# Patient Record
Sex: FEMALE | Race: AMERICAN INDIAN OR ALASKA NATIVE
[De-identification: names, ages, dates, MRNs, and addresses within clinical notes are randomized per-mention and may not be internally consistent; named-entity substitution may affect disease eponyms.]

---

## 2018-04-29 NOTE — EMERGENCY DEPARTMENT REPORT
Blank Doc





- Documentation


Documentation: 





Patient is 68 years old female history of diabetes.  Patient presented to the 

ER complaining of abdominal pain, crampy in nature associated with nausea and 

vomiting and diarrhea.  Patient stated that she is unable to keep anything down 

since Monday.  She also complaining of generalized weakness.  Patient will need 

further evaluation so patient will be moved to our main ED.

## 2018-04-29 NOTE — EMERGENCY DEPARTMENT REPORT
HPI





- General


Chief Complaint: Abdominal Pain


Time Seen by Provider: 18 16:14





- hospitals


HPI: 





Room 24





The patient is a 68-year-old female presented with a chief complaint of 

abdominal pain.  The patient states her symptoms began 6 days ago with nausea 

and vomiting that has been intermittent.  The patient states she also developed 

diffuse abdominal pain 6 days ago.  Patient denies fever, dysuria, hematuria or 

recent antibiotic use.  The patient states she had a small amount of diarrhea 

this morning.  The patient states she's had a decreased appetite.  The patient 

currently gives her pain a score of 6/10





Location: Diffuse abdomen


Duration: 6 days


Quality: Pain


Severity:6/10


Modifying factors: [see above]


Context: [see above]


Mode of transportation: [not driving]





ED Past Medical Hx





- Past Medical History


Hx Hypertension: Yes


Hx Diabetes: Yes


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





- Surgical History


Additional Surgical History: cataracts





- Family History


Family history: no significant





- Social History


Smoking Status: Never Smoker


Substance Use Type: None





- Medications


Home Medications: 


 Home Medications











 Medication  Instructions  Recorded  Confirmed  Last Taken  Type


 


Famotidine [Pepcid] 20 mg PO BID #20 tablet 18  Unknown Rx


 


HYDROcodone/APAP 5-325 [[place] 1 - 2 each PO Q6HR PRN #14 tablet 18  

Unknown Rx





5/325]     


 


Promethazine [Phenergan TAB] 25 mg PO Q6HR PRN #20 tab 18  Unknown Rx


 


Promethazine [Phenergan] 25 mg OR Q6HR PRN #5 supp.rect 18  Unknown Rx














ED Review of Systems


ROS: 


Stated complaint: VOMITTING,ABD PAIN,DIARRHEA


Other details as noted in HPI





Constitutional: denies: fever


Gastrointestinal: abdominal pain, nausea, vomiting, diarrhea


Genitourinary: denies: dysuria, hematuria





Physical Exam





- Physical Exam


Vital Signs: 


 Vital Signs











  18





  16:08


 


Temperature 97.6 F


 


Pulse Rate 99 H


 


Respiratory 18





Rate 


 


Blood Pressure 184/100


 


O2 Sat by Pulse 99





Oximetry 











Physical Exam: 





GENERAL: The patient is well-developed well-nourished female sitting on 

stretcher not appearing to be in acute distress. []


HEENT: Normocephalic.  Atraumatic.  Extraocular motions are intact.  Patient 

has moist mucous membranes.


NECK: Supple.  Trachea midline


CHEST/LUNGS: Clear to auscultation.  There is no respiratory distress noted.


HEART/CARDIOVASCULAR: Regular.  There is no tachycardia.  There is no gallop 

rub or murmur.


ABDOMEN: Abdomen is soft, with discomfort to palpation in all quadrants except 

the left upper quadrant.  Patient has normal bowel sounds.  There is no 

abdominal distention.


SKIN: There is no rash.  There is no edema.  There is no diaphoresis.


NEURO: The patient is awake, alert, and oriented.  The patient is cooperative.  

The patient has normal speech


MUSCULOSKELETAL:  There is no evidence of acute injury.





ED Course


 Vital Signs











  18





  16:08


 


Temperature 97.6 F


 


Pulse Rate 99 H


 


Respiratory 18





Rate 


 


Blood Pressure 184/100


 


O2 Sat by Pulse 99





Oximetry 














ED Medical Decision Making





- Lab Data


Result diagrams: 


 18 16:22





 18 16:22





 Laboratory Tests











  18





  16:22 16:22


 


WBC  8.4 


 


RBC  4.07 


 


Hgb  11.0 


 


Hct  35.7 


 


MCV  88 


 


MCH  27 L 


 


MCHC  31 


 


RDW  15.6 H 


 


Plt Count  326 


 


Lymph % (Auto)  29.5 


 


Mono % (Auto)  7.1 


 


Eos % (Auto)  1.6 


 


Baso % (Auto)  0.5 


 


Lymph #  2.5 


 


Mono #  0.6 


 


Eos #  0.1 


 


Baso #  0.0 


 


Seg Neutrophils %  61.3 


 


Seg Neutrophils #  5.1 


 


Sodium   137


 


Potassium   3.5 L


 


Chloride   95.0 L


 


Carbon Dioxide   28


 


Anion Gap   18


 


BUN   10


 


Creatinine   0.7


 


Estimated GFR   > 60


 


BUN/Creatinine Ratio   14


 


Glucose   158 H


 


Calcium   9.6


 


Total Bilirubin   0.30


 


Direct Bilirubin   < 0.2


 


Indirect Bilirubin   0.1


 


AST   16


 


ALT   13


 


Alkaline Phosphatase   65


 


Total Protein   7.8


 


Albumin   4.2


 


Albumin/Globulin Ratio   1.2


 


Lipase   45














- Radiology Data


Radiology results: report reviewed (CT abdomen and pelvis), image reviewed (CT 

abdomen and pelvis)





[facility] 


[address] [place] [address] 





Cat Scan Report 


Signed 





Patient: GRISEL ECHEVARRIA MR#: [id number] 


: 1950 Acct:[id number] 


Age/Sex: 68 / F ADM Date: 18 


Loc: ED 


Attending Dr: 








Ordering Physician: BRADEN WEBER MD 


Date of Service: 04/29/18 


Procedure(s): CT abdomen pelvis w con 


Accession Number(s): [id number] 





cc: BRADEN WEBER MD 








FINAL REPORT 





EXAM: CT ABDOMEN PELVIS W CON 





HISTORY: diffuse abdominal pain 





TECHNIQUE: Spiral CT scanning of the abdomen and pelvis after 


the uneventful administration of IV contrast. Multiplanar 


reformations. 100 mL Omnipaque IV. 





PRIORS: None. 





FINDINGS: 


Abdomen: 





Visualized lung bases grossly unremarkable. 





Probable tiny gallstones in the dependent portion the gallbladder 


without significant pericholecystic fluid. 





Liver shows diffusely decreased attenuation without focal 


abnormality. 





Spleen without significant abnormality. 





Pancreas without significant abnormality. 





Probable small bilateral renal cysts, largest in right renal 


lower pole measuring approximately 1.2 cm. No apparent renal 


calcifications or significant hydronephrosis. 





Adrenal glands without significant abnormality. 





Pelvis: 





Bowel grossly unremarkable, with moderate-large amount of 


retained stool. 





Appendix within normal limits. 





No significant free peritoneal fluid, discrete abscess or 


apparent adenopathy. 





Abdominal aorta non-aneurysmal. 





Uterus prominent, with lobular margins. 





Mild degenerative changes in thoracolumbar spine. 





IMPRESSION: 


1. Probable cholelithiasis. 





2. Findings compatible with fatty infiltration in the liver. 





3. Findings suggestive of constipation. 





4. Probable leiomyomatous change in the uterus. . 











Transcribed By: [facility] 


Dictated By: LIAM LOWE MD 


Electronically Authenticated By: LIAM LOWE MD 


Signed Date/Time: 18 











DD/DT: 18 


TD/TT: 18





- Differential Diagnosis


gastritis, peptic ulcer disease, GERD, pancreatitis, gastroenteritis


Critical care attestation.: 


If time is entered above; I have spent that time in minutes in the direct care 

of this critically ill patient, excluding procedure time.








ED Disposition


Clinical Impression: 


 Acute abdominal pain, Cholelithiasis, Nausea & vomiting





Disposition: DC-01 TO HOME OR SELFCARE


Is pt being admited?: No


Does the pt Need Aspirin: No


Condition: Stable


Instructions:  Abdominal Pain (ED)


Additional Instructions: 


Return to the emergency department immediately should you develop worsening 

symptoms, fever, inability to tolerate food or liquid or any other concerns.


Prescriptions: 


Famotidine [Pepcid] 20 mg PO BID #20 tablet


HYDROcodone/APAP 5-325 [Norco 5/325] 1 - 2 each PO Q6HR PRN #14 tablet


 PRN Reason: Pain


Promethazine [Phenergan TAB] 25 mg PO Q6HR PRN #20 tab


 PRN Reason: Nausea


Promethazine [Phenergan] 25 mg OR Q6HR PRN #5 supp.rect


 PRN Reason: Vomiting


Referrals: 


PRIMARY CARE,MD [Primary Care Provider] - ADELFO GARY MD [Staff Physician] - 3-5 Days (Dr. Lujan is a 

gastroenterologist.  Please follow up with him for further evaluation)


Time of Disposition: 18:28

## 2018-04-29 NOTE — CAT SCAN REPORT
FINAL REPORT



EXAM:  CT ABDOMEN PELVIS W CON



HISTORY:  diffuse abdominal pain 



TECHNIQUE:  Spiral CT scanning of the abdomen and pelvis after

the uneventful administration of IV contrast. Multiplanar

reformations. 100 mL Omnipaque IV.



PRIORS:  None.



FINDINGS:  

Abdomen: 



Visualized lung bases grossly unremarkable.  



Probable tiny gallstones in the dependent portion the gallbladder

without significant pericholecystic fluid.



Liver shows diffusely decreased attenuation without focal

abnormality. 



Spleen without significant abnormality. 



Pancreas without significant abnormality. 



Probable small bilateral renal cysts, largest in right renal

lower pole measuring approximately 1.2 cm. No apparent renal

calcifications or significant hydronephrosis. 



Adrenal glands without significant abnormality. 



Pelvis: 



Bowel grossly unremarkable, with moderate-large amount of

retained stool. 



Appendix within normal limits. 



No significant free peritoneal fluid, discrete abscess or

apparent adenopathy. 



Abdominal aorta non-aneurysmal. 



Uterus prominent, with lobular margins. 



Mild degenerative changes in thoracolumbar spine. 



IMPRESSION:  

1. Probable cholelithiasis. 



2. Findings compatible with fatty infiltration in the liver. 



3. Findings suggestive of constipation. 



4. Probable leiomyomatous change in the uterus. .

## 2018-05-09 ENCOUNTER — HOSPITAL ENCOUNTER (EMERGENCY)
Dept: HOSPITAL 5 - ED | Age: 68
LOS: 1 days | Discharge: HOME | End: 2018-05-10
Payer: MEDICARE

## 2018-05-09 DIAGNOSIS — R10.84: ICD-10-CM

## 2018-05-09 DIAGNOSIS — R11.2: Primary | ICD-10-CM

## 2018-05-09 DIAGNOSIS — Z88.6: ICD-10-CM

## 2018-05-09 DIAGNOSIS — R19.7: ICD-10-CM

## 2018-05-09 DIAGNOSIS — I10: ICD-10-CM

## 2018-05-09 DIAGNOSIS — E11.9: ICD-10-CM

## 2018-05-09 PROCEDURE — 81001 URINALYSIS AUTO W/SCOPE: CPT

## 2018-05-09 PROCEDURE — 82553 CREATINE MB FRACTION: CPT

## 2018-05-09 PROCEDURE — 36415 COLL VENOUS BLD VENIPUNCTURE: CPT

## 2018-05-09 PROCEDURE — 85025 COMPLETE CBC W/AUTO DIFF WBC: CPT

## 2018-05-09 PROCEDURE — 74177 CT ABD & PELVIS W/CONTRAST: CPT

## 2018-05-09 PROCEDURE — 80053 COMPREHEN METABOLIC PANEL: CPT

## 2018-05-09 PROCEDURE — 99284 EMERGENCY DEPT VISIT MOD MDM: CPT

## 2018-05-09 PROCEDURE — 84484 ASSAY OF TROPONIN QUANT: CPT

## 2018-05-09 PROCEDURE — 93005 ELECTROCARDIOGRAM TRACING: CPT

## 2018-05-09 PROCEDURE — 93010 ELECTROCARDIOGRAM REPORT: CPT

## 2018-05-09 PROCEDURE — 96361 HYDRATE IV INFUSION ADD-ON: CPT

## 2018-05-09 PROCEDURE — 83690 ASSAY OF LIPASE: CPT

## 2018-05-09 PROCEDURE — 82550 ASSAY OF CK (CPK): CPT

## 2018-05-09 PROCEDURE — 96374 THER/PROPH/DIAG INJ IV PUSH: CPT

## 2018-05-10 VITALS — SYSTOLIC BLOOD PRESSURE: 163 MMHG | DIASTOLIC BLOOD PRESSURE: 97 MMHG

## 2018-05-10 LAB
ALBUMIN SERPL-MCNC: 4.2 G/DL (ref 3.9–5)
ALT SERPL-CCNC: 14 UNITS/L (ref 7–56)
BACTERIA #/AREA URNS HPF: (no result) /HPF
BASOPHILS # (AUTO): 0 K/MM3 (ref 0–0.1)
BASOPHILS NFR BLD AUTO: 0.5 % (ref 0–1.8)
BILIRUB UR QL STRIP: (no result)
BLOOD UR QL VISUAL: (no result)
BUN SERPL-MCNC: 13 MG/DL (ref 7–17)
BUN/CREAT SERPL: 16 %
CALCIUM SERPL-MCNC: 9.5 MG/DL (ref 8.4–10.2)
EOSINOPHIL # BLD AUTO: 0.2 K/MM3 (ref 0–0.4)
EOSINOPHIL NFR BLD AUTO: 2 % (ref 0–4.3)
HCT VFR BLD CALC: 33.7 % (ref 30.3–42.9)
HEMOLYSIS INDEX: 20
HGB BLD-MCNC: 10.8 GM/DL (ref 10.1–14.3)
LIPASE SERPL-CCNC: 41 UNITS/L (ref 13–60)
LYMPHOCYTES # BLD AUTO: 2.5 K/MM3 (ref 1.2–5.4)
LYMPHOCYTES NFR BLD AUTO: 28.4 % (ref 13.4–35)
MCH RBC QN AUTO: 28 PG (ref 28–32)
MCHC RBC AUTO-ENTMCNC: 32 % (ref 30–34)
MCV RBC AUTO: 87 FL (ref 79–97)
MONOCYTES # (AUTO): 0.7 K/MM3 (ref 0–0.8)
MONOCYTES % (AUTO): 8 % (ref 0–7.3)
MUCOUS THREADS #/AREA URNS HPF: (no result) /HPF
PH UR STRIP: 6 [PH] (ref 5–7)
PLATELET # BLD: 249 K/MM3 (ref 140–440)
PROT UR STRIP-MCNC: (no result) MG/DL
RBC # BLD AUTO: 3.89 M/MM3 (ref 3.65–5.03)
RBC #/AREA URNS HPF: 1 /HPF (ref 0–6)
UROBILINOGEN UR-MCNC: < 2 MG/DL (ref ?–2)
WBC #/AREA URNS HPF: 6 /HPF (ref 0–6)

## 2018-05-10 NOTE — CAT SCAN REPORT
FINAL REPORT



PROCEDURE:  CT ABDOMEN PELVIS W CON



TECHNIQUE:  Computerized axial tomography of the abdomen and

pelvis was performed after the IV injection of iodinated nonionic

contrast. 



HISTORY:  abdominal pain 



COMPARISON:  04/29/2018



FINDINGS:  

Visualized lower thorax: No significant abnormality.



Liver: Normal size and attenuation.



Spleen: Normal size and attenuation.



Gallbladder and biliary system: Stones identified within the

gallbladder lumen. No dilatation of the biliary ductal system.



Pancreas: Normal.



Adrenals: Normal.



Kidneys: Normal.



GI tract: No obstruction. No ileus or enteritis. The cecum,

appendix and colon are normal..



Lymph nodes and mesentery: Normal. 



Vasculature: Normal.



Bladder: Normal.



Reproductive organs: Normal.



Peritoneum: No free fluid.



Musculoskeletal structures: No significant abnormality.



Other: None.  



IMPRESSION:  

There is no evidence of intestinal or urinary tract obstruction.

No ileus or enteritis. The appendix is normal.

## 2018-05-10 NOTE — EMERGENCY DEPARTMENT REPORT
ED Abdominal Pain HPI





- General


Chief Complaint: Nausea/Vomiting/Diarrhea


Stated Complaint: DIZZINESS; N/V


Time Seen by Provider: 05/10/18 02:47


Source: patient


Mode of arrival: Ambulatory


Limitations: No Limitations





- History of Present Illness


Initial Comments: 





Patient is 68 years old female history of hypertension and diabetes.  Patient 

presented to the ER complaining of several days of abdominal pain nausea 

vomiting and diarrhea.  Patient stated that she is feeling weak all over.  She 

denied any chest pain or shortness of breath or cough.  No fever.


MD Complaint: abdominal pain


-: days(s)


Location: diffuse


Radiation: none


Migration to: no migration


Severity: moderate


Quality: cramping


Consistency: constant


Associated Symptoms: nausea, vomiting, diarrhea





- Related Data


 Previous Rx's











 Medication  Instructions  Recorded  Last Taken  Type


 


Famotidine [Pepcid] 20 mg PO BID #20 tablet 04/29/18 Unknown Rx


 


HYDROcodone/APAP 5-325 [[place] 1 - 2 each PO Q6HR PRN #14 tablet 04/29/18 

Unknown Rx





5/325]    


 


Promethazine [Phenergan TAB] 25 mg PO Q6HR PRN #20 tab 04/29/18 Unknown Rx


 


Promethazine [Phenergan] 25 mg OR Q6HR PRN #5 supp.rect 04/29/18 Unknown Rx











 Allergies











Allergy/AdvReac Type Severity Reaction Status Date / Time


 


aspirin Allergy  Shortness Verified 04/29/18 16:11





   of Breath  














ED Review of Systems


ROS: 


Stated complaint: DIZZINESS; N/V


Other details as noted in HPI





Comment: All other systems reviewed and negative


Respiratory: denies: cough, orthopnea, shortness of breath, SOB with exertion, 

SOB at rest


Cardiovascular: denies: chest pain, palpitations


Gastrointestinal: abdominal pain, nausea, vomiting, diarrhea.  denies: 

constipation, hematemesis, melena, hematochezia


Neurological: denies: headache, weakness, numbness, paresthesias, confusion, 

abnormal gait





ED Past Medical Hx





- Past Medical History


Hx Hypertension: Yes


Hx Diabetes: Yes


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





- Surgical History


Additional Surgical History: cataracts





- Social History


Smoking Status: Never Smoker


Substance Use Type: None





- Medications


Home Medications: 


 Home Medications











 Medication  Instructions  Recorded  Confirmed  Last Taken  Type


 


Famotidine [Pepcid] 20 mg PO BID #20 tablet 04/29/18  Unknown Rx


 


HYDROcodone/APAP 5-325 [[place] 1 - 2 each PO Q6HR PRN #14 tablet 04/29/18  

Unknown Rx





5/325]     


 


Promethazine [Phenergan TAB] 25 mg PO Q6HR PRN #20 tab 04/29/18  Unknown Rx


 


Promethazine [Phenergan] 25 mg OR Q6HR PRN #5 supp.rect 04/29/18  Unknown Rx














ED Physical Exam





- General


Limitations: No Limitations


General appearance: alert, in no apparent distress





- Head


Head exam: Present: atraumatic, normocephalic, normal inspection





- Eye


Eye exam: Present: normal appearance, PERRL





- ENT


ENT exam: Present: normal exam, normal orophraynx, mucous membranes moist





- Neck


Neck exam: Present: normal inspection, full ROM.  Absent: tenderness, 

meningismus, lymphadenopathy, thyromegaly





- Respiratory


Respiratory exam: Present: normal lung sounds bilaterally.  Absent: respiratory 

distress, wheezes, rales, rhonchi, stridor, chest wall tenderness, accessory 

muscle use, decreased breath sounds, prolonged expiratory





- Cardiovascular


Cardiovascular Exam: Present: regular rate, normal rhythm, normal heart sounds





- GI/Abdominal


GI/Abdominal exam: Present: soft, tenderness (diffuse), normal bowel sounds.  

Absent: distended, guarding, rebound, rigid, organomegaly, mass, bruit, 

pulsatile mass, hernia





- Extremities Exam


Extremities exam: Present: normal inspection, full ROM, normal capillary refill





- Back Exam


Back exam: Present: normal inspection, full ROM.  Absent: tenderness, CVA 

tenderness (R), CVA tenderness (L), muscle spasm, paraspinal tenderness, 

vertebral tenderness, rash noted





- Neurological Exam


Neurological exam: Present: alert, oriented X3, CN II-XII intact, normal gait, 

reflexes normal





- Skin


Skin exam: Present: warm, intact, normal color





ED Course


 Vital Signs











  05/09/18 05/09/18 05/10/18





  23:16 23:24 01:40


 


Temperature 99.1 F 99.1 F 


 


Pulse Rate 96 H 95 H 74


 


Respiratory 16 18 





Rate   


 


Blood Pressure 158/84 158/84 


 


O2 Sat by Pulse 99 97 





Oximetry   














  05/10/18 05/10/18 05/10/18





  01:46 02:00 02:10


 


Temperature  99.0 F 


 


Pulse Rate 82 81 


 


Respiratory   18





Rate   


 


Blood Pressure  159/94 


 


O2 Sat by Pulse   100





Oximetry   














- Reevaluation(s)


Reevaluation #1: 





05/10/18 05:01


Patient stated that she is feeling much better.  Her abdomen is soft nontender.

  CT abdomen and pelvis did not show anything acute.  I advised patient to 

drink more fluids and follow up with her primary care physician in the next 2-3 

days.  I also advised to return to the ER if her symptoms are not improving.





ED Medical Decision Making





- Lab Data


Result diagrams: 


 05/09/18 23:56





 05/09/18 23:56


Critical care attestation.: 


If time is entered above; I have spent that time in minutes in the direct care 

of this critically ill patient, excluding procedure time.








ED Disposition


Clinical Impression: 


 Nausea & vomiting, Abdominal pain





Disposition: DC-01 TO HOME OR SELFCARE


Is pt being admited?: No


Condition: Stable


Instructions:  Abdominal Pain (ED), Acute Nausea and Vomiting (ED)


Referrals: 


PRIMARY CARE,MD [Primary Care Provider] - 3-5 Days